# Patient Record
Sex: MALE | Race: WHITE
[De-identification: names, ages, dates, MRNs, and addresses within clinical notes are randomized per-mention and may not be internally consistent; named-entity substitution may affect disease eponyms.]

---

## 2017-12-24 ENCOUNTER — HOSPITAL ENCOUNTER (EMERGENCY)
Dept: HOSPITAL 80 - FED | Age: 47
Discharge: HOME | End: 2017-12-24
Payer: MEDICAID

## 2017-12-24 VITALS — OXYGEN SATURATION: 96 %

## 2017-12-24 VITALS
DIASTOLIC BLOOD PRESSURE: 63 MMHG | TEMPERATURE: 98.4 F | SYSTOLIC BLOOD PRESSURE: 132 MMHG | RESPIRATION RATE: 18 BRPM | HEART RATE: 82 BPM

## 2017-12-24 DIAGNOSIS — F19.239: Primary | ICD-10-CM

## 2017-12-24 DIAGNOSIS — E86.9: ICD-10-CM

## 2017-12-24 LAB — PLATELET # BLD: 299 10^3/UL (ref 150–400)

## 2017-12-24 NOTE — EDPHY
HPI/HX/ROS/PE/MDM


Narrative: 





CHIEF COMPLAINT:  Head pressure





HISTORY OF PRESENT ILLNESS:   The patient is a 48 y/o male with a history of 

depression, nerve pain, hypercholesterolemia, and anxiety complaining of head 

pressure for the past three to four days after stopping his daily medication. 

He moved to [place] from [place] three weeks ago and has been slowly running 

out of his medication. He ran out of BuSpar and Zoloft 2 weeks ago, gabapentin 

1 week ago, Lipitor 2 days ago, and Prilosec yesterday. Three or four days ago, 

he began feeling a pressure in his head. He describes the pressure as 

intermittent during the day, worse with exertion, and reminiscent of feeling 

"his synapses in his brain firing very fast", similar to a throbbing. He also 

has had several instances of seeing falling, silver stars for five to ten 

minutes at a time. He has associated numbness in his hands. He is an every day 

smoker with a cough and uses marijuana daily. He denies nausea, vomiting, or 

any other associated symptoms. He denies opiate use, aspirin use, or other drug 

use. He denies falls or other trauma. He denies history of migraines, 

hypertension, or stroke.





No fever, chills, chest pain, shortness of breath, palpitations, vomiting, 

diarrhea, urinary complaints, headache, lightheadedness. 





REVIEW OF SYSTEMS:


Aside from elements discussed in the HPI, a comprehensive 10-point review of 

systems was reviewed and is negative.





PAST MEDICAL HISTORY:   Depression, nerve pain, hypercholesterolemia, anxiety





SOCIAL HISTORY:   Moved from [place], daily marijuana user





VITAL SIGNS: Reviewed by me


GENERAL: Well-developed, well-nourished, resting comfortably in no respiratory 

distress.


HEENT: No visible signs of trauma. Eyes: No icterus, no injection. PERRL, EOMI, 

no nystagmus. Mouth: moist mucous membranes.  No erythema or lesions. Neck: 

supple with no adenopathy. No meningismus.  


LUNGS: Clear to auscultation bilaterally, no wheezes, rhonchi or rales.


CARDIAC: Regular rate and rhythm, no rubs, murmurs or gallops.


ABDOMEN: Soft, nontender, nondistended, bowel sounds normal.


BACK:  No CVA tenderness.


EXTREMITIES: No trauma. No edema.  Range of motion is normal throughout.


NEURO: Alert, oriented x3, cranial nerves II through XII intact, motor 5/5, 

sensory intact to light touch, normal gait. Non-focal exam 


SKIN: Warm and dry, no rash.


PSYCHIATRIC: Normal mentation, no agitation.





ED Course: 





The patient presents with head pressure after stopping his daily medications. 

He ran out of his medications since moving to the area recently. He has not 

taken his gabapentin, BuSpar, or Zoloft in over a week. He has associated hand 

numbness and episodes of seeing stars. ON exam, he is neurologically intact and 

has a nonfocal exam.  No signs of trauma, no fever or infectious complaints, 

headache is mild and not described as thunderclap, severe, or worse in life. 





1432: Labs are reassuring.  Fluid provided.  Symptoms improved.  Patient seen 

by [facility]  and resources provided to obtain meds and establish 

care.  


MDM: 





Diff dx considered included medication withdrawl symptoms, anxiety, electrolyte 

abnormalities, dehydration.





- Data Points


Laboratory Results: 


 Laboratory Results





 12/24/17 14:10 





 12/24/17 14:10 








Medications Given: 


 








Discontinued Medications





Sodium Chloride (Ns)  1,000 mls @ 0 mls/hr IV ONCE ONE; Wide Open


   PRN Reason: Protocol


   Stop: 12/24/17 13:27


   Last Admin: 12/24/17 14:10 Dose:  1,000 mls


Ibuprofen (Motrin)  600 mg PO EDNOW ONE


   Stop: 12/24/17 13:27


   Last Admin: 12/24/17 14:04 Dose:  600 mg








General


Time Seen by Provider: 12/24/17 12:59


Initial Vital Signs: 


 Initial Vital Signs











Temperature (C)  36.5 C   12/24/17 11:11


 


Heart Rate  92   12/24/17 11:11


 


Respiratory Rate  16   12/24/17 11:11


 


Blood Pressure  128/98 H  12/24/17 11:11


 


O2 Sat (%)  96   12/24/17 11:11








 











O2 Delivery Mode               Room Air














Allergies/Adverse Reactions: 


 





No Known Allergies Allergy (Verified 12/24/17 11:10)


 








Home Medications: 














 Medication  Instructions  Recorded


 


GABAPENTIN  12/24/17


 


Gabapentin [Neurontin 300 MG (*)] 300 mg PO HS #30 cap 12/24/17


 


Lisinopril [Zestril 20 mg (*)] 20 mg PO 12/24/17


 


Omeprazole 40 mg PO DAILY #30 cap 12/24/17


 


Omeprazole [Prilosec 20 mg] 20 mg PO DAILY 12/24/17


 


Ranitidine HCl [Zantac 75] 75 mg PO DAILY #30 tablet 12/24/17


 


Sertraline HCl [Zoloft 25mg (*)] 25 mg PO DAILY #20 tab 12/24/17


 


busPIRone [Buspar (*)] 5 mg PO TID #30 tab 12/24/17














Departure





- Departure


Disposition: Home, Routine, Self-Care


Clinical Impression: 


 Symptom of drug withdrawal





Condition: Good


Instructions:  Omeprazole (By mouth), Sertraline (By mouth), Gabapentin (By 

mouth)


Additional Instructions: 


1. Drink lots of fluid and get lots of rest.


2. Follow-up with [facility].


3. Return to the ED for headache or other worsening of condition. 


Referrals: 


[facility],. [Clinic] - As per Instructions


Prescriptions: 


busPIRone [Buspar (*)] 5 mg PO TID #30 tab


Gabapentin [Neurontin 300 MG (*)] 300 mg PO HS #30 cap


Omeprazole 40 mg PO DAILY #30 cap


Ranitidine HCl [Zantac 75] 75 mg PO DAILY #30 tablet


Sertraline HCl [Zoloft 25mg (*)] 25 mg PO DAILY #20 tab


Report Scribed for: Yasmine Patel


Report Scribed by: Angelita Fields


Date of Report: 12/24/17


Time of Report: 14:17


Physician Review and Approval Statement: Portions of this note were transcribed 

by a medical scribe.  I personally performed a history, physical exam, medical 

decision making, and confirmed accuracy of information the transcribed note.

## 2017-12-24 NOTE — ASMTCMCOM
CM Note

 

CM Note                       

Notes:

Per MD, patient new to [facility] from [place] and has run out of RX. I spoke with him and gave him 

[facility] contact information, encouraged him to follow up there after [facility]. Per 

admissions, it appears he has active [place] Medicaid, so MD will write RX in the hopes that patient can 


fill them before his appointment at [facility].

 

Date Signed:  12/24/2017 01:47 PM

Electronically Signed By:Ela Baldwin RN

## 2018-06-16 ENCOUNTER — HOSPITAL ENCOUNTER (EMERGENCY)
Dept: HOSPITAL 80 - FED | Age: 48
Discharge: HOME | End: 2018-06-16
Payer: MEDICAID

## 2018-06-16 VITALS — DIASTOLIC BLOOD PRESSURE: 85 MMHG | SYSTOLIC BLOOD PRESSURE: 145 MMHG

## 2018-06-16 DIAGNOSIS — F10.920: ICD-10-CM

## 2018-06-16 DIAGNOSIS — F32.9: Primary | ICD-10-CM

## 2018-06-16 LAB — PLATELET # BLD: 286 10^3/UL (ref 150–400)

## 2018-06-16 PROCEDURE — G0480 DRUG TEST DEF 1-7 CLASSES: HCPCS

## 2018-06-16 NOTE — EDPHY
H & P


Smoking Status: Current every day smoker


Time Seen by Provider: 06/16/18 07:51


HPI/ROS: 





CHIEF COMPLAINT:  "I drank too much"





HISTORY OF PRESENT ILLNESS:  Patient is a 48-year-old male who is brought to 

the emergency department by EMS and PD on an M1 hold.  Per report, the patient 

was texting his girlfriend.  He wrote that he was the devil and that he wanted 

to kill himself.  Please subsequently broken to his swelling and found the 

patient face down intoxicated appearing.





Patient states that he drank too much.  He smoked marijuana earlier but denies 

other drug use.  Patient has no suicidal thoughts at this time.  He denies 

ingestion for self-harm.  Patient does have a history of depression.  He has 

had suicidal thoughts previously.  He currently takes Zoloft is seen at 

[facility].  Patient has no headache.  No neck pain.  No focal weakness or 

numbness.





REVIEW OF SYSTEMS:  


My complete review of systems is negative except as mentioned in the HPI. (

Genoveva Gomez)


Past Medical/Surgical History: 





Includes depression, GERD





Social history:  Includes alcohol and THC. (Genoveva Gomez)


Physical Exam: 





Vitals noted


GENERAL:  No acute distress, alert.


HEENT:  Eyes normal to inspection, normal pharynx, no signs of dehydration.  

Patient has a small bump on his head posteriorly.  There is no crepitus.  No 

tenderness palpation.  No surrounding bruising.


NECK:  No thyromegaly, no lymphadenopathy, supple.  No spinal tenderness 

palpation.  No step-off.


RESPIRATORY:  Clear to auscultation bilaterally, no rales, rhonchi or wheezing.


CVS:  Regular rate and rhythm, no rubs, murmurs, or gallops.


ABDOMEN:  Soft, nontender, nondistended, no organomegaly.


BACK:  Normal to inspection, no CVA tenderness.


SKIN:  Normal color, no rash, warm, dry.  No pallor.


EXTREMITIES:  No pedal edema, no calf tenderness, no Homans sign or cords, no 

joint swelling.


NEURO/PSYCH:  Alert and oriented x3.  Patient is mildly intoxicated appearing 

but answers all my questions appropriately.  Normal motor sensory exam.  No 

obvious cranial nerve deficit. (Genoveva Gomez)


Constitutional: 





 Initial Vital Signs











Temperature (C)  36.7 C   06/16/18 07:58


 


Heart Rate  78   06/16/18 07:58


 


Respiratory Rate  18   06/16/18 07:58


 


Blood Pressure  142/88 H  06/16/18 07:58


 


O2 Sat (%)  95   06/16/18 07:58








 











O2 Delivery Mode               Room Air














Allergies/Adverse Reactions: 


 





No Known Allergies Allergy (Verified 12/24/17 11:10)


 








Home Medications: 














 Medication  Instructions  Recorded


 


GABAPENTIN  12/24/17


 


Gabapentin [Neurontin 300 MG (*)] 300 mg PO HS #30 cap 12/24/17


 


Lisinopril [Zestril 20 mg (*)] 20 mg PO 12/24/17


 


Omeprazole 40 mg PO DAILY #30 cap 12/24/17


 


Omeprazole [Prilosec 20 mg] 20 mg PO DAILY 12/24/17


 


Ranitidine HCl [Zantac 75] 75 mg PO DAILY #30 tablet 12/24/17


 


Sertraline HCl [Zoloft 25mg (*)] 25 mg PO DAILY #20 tab 12/24/17


 


busPIRone [Buspar (*)] 5 mg PO TID #30 tab 12/24/17














Medical Decision Making


ED Course/Re-evaluation: 





In the emergency department I discussed possible etiologies with the patient.  

Answered all questions.  I discussed the case with both EMS and PD.  Hold was 

placed by PD.  Laboratory studies were ordered.





Patient's sodium is mildly high at 150. Patient's CBC is normal.  Alcohol 71. 

Tox screen is positive for THC.  





The patient was stable during his stay.  He is awaiting evaluation.





Patient is signed out to Dr. Hoover at change of shift. (Genoveva Gomez)





3:40 p.m. the patient has been evaluated by [facility].  They plan 

to follow up with him as an outpatient.  He is not suicidal and is now sober.  

They recommend discharge. (Trevor Hoover)


Differential Diagnosis: 





My differential includes but is not limited to depression, suicidal ideation, 

alcohol intoxication, marijuana abuse, drug abuse, electrolyte abnormality, 

sugar abnormality (Genoveva Gomez)





- Data Points


Laboratory Results: 





 Laboratory Results





 06/16/18 07:53 





 06/16/18 07:53 





 











  06/16/18 06/16/18 06/16/18





  08:05 07:53 07:53


 


WBC      9.54 10^3/uL H 10^3/uL





     (3.80-9.50) 


 


RBC      5.03 10^6/uL 10^6/uL





     (4.40-6.38) 


 


Hgb      16.0 g/dL g/dL





     (13.7-17.5) 


 


Hct      44.8 % %





     (40.0-51.0) 


 


MCV      89.1 fL fL





     (81.5-99.8) 


 


MCH      31.8 pg pg





     (27.9-34.1) 


 


MCHC      35.7 g/dL g/dL





     (32.4-36.7) 


 


RDW      15.0 % %





     (11.5-15.2) 


 


Plt Count      286 10^3/uL 10^3/uL





     (150-400) 


 


MPV      9.9 fL fL





     (8.7-11.7) 


 


Neut % (Auto)      74.8 % H %





     (39.3-74.2) 


 


Lymph % (Auto)      16.1 % %





     (15.0-45.0) 


 


Mono % (Auto)      7.9 % %





     (4.5-13.0) 


 


Eos % (Auto)      0.1 % L %





     (0.6-7.6) 


 


Baso % (Auto)      0.3 % %





     (0.3-1.7) 


 


Nucleat RBC Rel Count      0.0 % %





     (0.0-0.2) 


 


Absolute Neuts (auto)      7.13 10^3/uL H 10^3/uL





     (1.70-6.50) 


 


Absolute Lymphs (auto)      1.54 10^3/uL 10^3/uL





     (1.00-3.00) 


 


Absolute Monos (auto)      0.75 10^3/uL 10^3/uL





     (0.30-0.80) 


 


Absolute Eos (auto)      0.01 10^3/uL L 10^3/uL





     (0.03-0.40) 


 


Absolute Basos (auto)      0.03 10^3/uL 10^3/uL





     (0.02-0.10) 


 


Absolute Nucleated RBC      0.00 10^3/uL 10^3/uL





     (0-0.01) 


 


Immature Gran %      0.8 % %





     (0.0-1.1) 


 


Immature Gran #      0.08 10^3/uL 10^3/uL





     (0.00-0.10) 


 


Sodium    150 mEq/L H mEq/L  





    (135-145)  


 


Potassium    4.2 mEq/L mEq/L  





    (3.3-5.0)  


 


Chloride    110 mEq/L mEq/L  





    ()  


 


Carbon Dioxide    20 mEq/l L mEq/l  





    (22-31)  


 


Anion Gap    20 mEq/L H mEq/L  





    (8-16)  


 


BUN    21 mg/dL mg/dL  





    (7-23)  


 


Creatinine    0.9 mg/dL mg/dL  





    (0.7-1.3)  


 


Estimated GFR    > 60   





    


 


Glucose    116 mg/dL H mg/dL  





    ()  


 


Calcium    9.7 mg/dL mg/dL  





    (8.5-10.4)  


 


Salicylates    < 1.0 mg/dL L mg/dL  





    (2.0-20.0)  


 


Urine Opiates Screen  NEGATIVE     





   (NEGATIVE)   


 


Acetaminophen    < 10 mcg/mL L mcg/mL  





    (10-30)  


 


Urine Barbiturates  NEGATIVE     





   (NEGATIVE)   


 


Ur Phencyclidine Scrn  NEGATIVE     





   (NEGATIVE)   


 


Ur Amphetamine Screen  NEGATIVE     





   (NEGATIVE)   


 


U Benzodiazepines Scrn  NEGATIVE     





   (NEGATIVE)   


 


Urine Cocaine Screen  NEGATIVE     





   (NEGATIVE)   


 


U Marijuana (THC) Screen  NON-NEGATIVE  H     





   (NEGATIVE)   


 


Ethyl Alcohol    71 mg/dL H mg/dL  





    (0-10)  











Medications Given: 





 








Discontinued Medications





Al Hydroxide/Mg Hydroxide (Maalox Susp)  30 ml PO EDNOW ONE


   Stop: 06/16/18 08:02


   Last Admin: 06/16/18 08:09 Dose:  30 ml


Al Hydroxide/Mg Hydroxide (Maalox Susp)  30 ml PO EDNOW ONE


   Stop: 06/16/18 11:49


   Last Admin: 06/16/18 11:55 Dose:  30 ml


Ondansetron HCl (Zofran Odt)  4 mg PO EDNOW ONE


   Stop: 06/16/18 11:48


   Last Admin: 06/16/18 11:55 Dose:  Not Given








Departure





- Departure


Disposition: Home, Routine, Self-Care


Clinical Impression: 


Alcohol intoxication


Qualifiers:


 Complication of substance-induced condition: uncomplicated Qualified Code(s): 

F10.920 - Alcohol use, unspecified with intoxication, uncomplicated





Depression


Qualifiers:


 Depression Type: unspecified Qualified Code(s): F32.9 - Major depressive 

disorder, single episode, unspecified





Condition: Good


Instructions:  Depression (ED), Alcohol Intoxication (ED)


Referrals: 


[facility],. [Clinic] - 2-3 days, if not improved


[facility],. [Clinic] - As per Instructions

## 2018-07-21 ENCOUNTER — HOSPITAL ENCOUNTER (OUTPATIENT)
Dept: HOSPITAL 80 - FED | Age: 48
Setting detail: OBSERVATION
LOS: 1 days | Discharge: HOME | End: 2018-07-22
Attending: FAMILY MEDICINE | Admitting: FAMILY MEDICINE
Payer: MEDICAID

## 2018-07-21 DIAGNOSIS — R06.02: ICD-10-CM

## 2018-07-21 DIAGNOSIS — E66.9: ICD-10-CM

## 2018-07-21 DIAGNOSIS — R07.9: Primary | ICD-10-CM

## 2018-07-21 DIAGNOSIS — E78.5: ICD-10-CM

## 2018-07-21 DIAGNOSIS — M71.22: ICD-10-CM

## 2018-07-21 DIAGNOSIS — F41.8: ICD-10-CM

## 2018-07-21 LAB
CK SERPL-CCNC: 132 IU/L (ref 0–224)
INR PPP: 0.96 (ref 0.83–1.16)
PLATELET # BLD: 284 10^3/UL (ref 150–400)
PROTHROMBIN TIME: 13 SEC (ref 12–15)

## 2018-07-21 PROCEDURE — 93017 CV STRESS TEST TRACING ONLY: CPT

## 2018-07-21 PROCEDURE — G0480 DRUG TEST DEF 1-7 CLASSES: HCPCS

## 2018-07-21 PROCEDURE — 93306 TTE W/DOPPLER COMPLETE: CPT

## 2018-07-21 PROCEDURE — 93971 EXTREMITY STUDY: CPT

## 2018-07-21 PROCEDURE — G0378 HOSPITAL OBSERVATION PER HR: HCPCS

## 2018-07-21 PROCEDURE — 99285 EMERGENCY DEPT VISIT HI MDM: CPT

## 2018-07-21 PROCEDURE — 93005 ELECTROCARDIOGRAM TRACING: CPT

## 2018-07-21 PROCEDURE — 71045 X-RAY EXAM CHEST 1 VIEW: CPT

## 2018-07-21 PROCEDURE — 96360 HYDRATION IV INFUSION INIT: CPT

## 2018-07-21 RX ADMIN — NITROGLYCERIN PRN MG: 0.4 TABLET SUBLINGUAL at 22:32

## 2018-07-21 RX ADMIN — NITROGLYCERIN PRN MG: 0.4 TABLET SUBLINGUAL at 22:42

## 2018-07-21 NOTE — CPEKG
Heart Rate: 80

RR Interval: 750

P-R Interval: 172

QRSD Interval: 88

QT Interval: 384

QTC Interval: 443

P Axis: 67

QRS Axis: 264

T Wave Axis: 66

EKG Severity - BORDERLINE ECG -

EKG Impression: SINUS RHYTHM

EKG Impression: MARKEDLY POSTERIOR QRS AXIS

EKG Impression: BORDERLINE T ABNORMALITIES, ANT-LAT LEADS

Electronically Signed By: Richard Serrato 22-Jul-2018 06:43:41

## 2018-07-21 NOTE — EDPHY
H & P


Stated Complaint: SOB and left thigh pain with CP and left arm pain


Time Seen by Provider: 07/21/18 22:03


HPI/ROS: 





HPI





CHIEF COMPLAINT:  Left-sided chest pain, left arm pain, left leg pain





HISTORY OF PRESENT ILLNESS:  48-year-old male, history of hyperlipidemia, 

borderline hypertension, and tobacco use smokes 1 pack per day, presents to the 

emergency room with complaint of left-sided chest pain left arm pain.  Patient 

reports that at 9:30 a.m. This evening which it is now 10 30 he developed some 

pain behind his left leg.  Describes an achy sensation.  This went into his 

left arm and left chest.  He states now he has some pressure in left side of 

his chest with some deep achiness in the left arm.  He denies any significant 

shortness of breath or pleuritic pain.  However does state over the past few 

days he is feeling more short of breath.


Currently does complain of left-sided chest pressure 6/10.





Past Medical History:  Hyperlipidemia, borderline hypertension, tobacco abuse





Past Surgical History:  No recent surgical history





Social History:  Smokes tobacco daily.





Family History:  Denies any immediate family history of cardiovascular disease 

denies heart disease mom dad sister brother.  However grandfather has heart 

disease








ROS   


REVIEW OF SYSTEMS:


A comprehensive 10 point review of systems is otherwise negative aside from 

elements mentioned in the history of present illness.








Exam   


Constitutional nontoxic appearing, obese,  triage nursing summary reviewed, 

vital signs reviewed, awake/alert. 


Eyes   normal conjunctivae and sclera, EOMI, PERRLA. 


HENT   normal inspection, atraumatic, moist mucus membranes, no epistaxis, neck 

supple/ no meningismus, no raccoon eyes. 


Respiratory   clear to auscultation bilaterally, normal breath sounds, no 

respiratory distress, no wheezing. 


Cardiovascular   rate normal, regular rhythm, no murmur, no edema, distal 

pulses normal. 


Gastrointestinal   soft, non-tender, no rebound, no guarding, normal bowel 

sounds, no distension, no pulsatile mass. 


Genitourinary   no CVA tenderness. 


Musculoskeletal  no significant swelling noted to either leg.  no midline 

vertebral tenderness, full range of motion, no calf swelling, no tenderness of 

extremities, no meningismus, good pulses, neurovascularly intact.


Skin   pink, warm, & dry, no rash, skin atraumatic. 


Neurologic   awake, alert and oriented x 3, AAOx3, moves all 4 extremities 

equally, motor intact, sensory intact, CN II-XII intact, normal cerebellar, 

normal vision, normal speech. 


Psychiatric   normal mood/affect. 


Heme/Lymph/Immune   no lymphadenopathy.





Differential diagnosis includes but is not limited to:  ACS, atypical chest pain

, pneumothorax, pneumonia, pulmonary embolism, aortic dissection, congestive 

heart failure, tumor, musculoskeletal pain, esophageal pain, GERD, peptic ulcer 

disease, pancreatitis





Medical Decision Making:  Plan for this patient IV establishment full cardiac 

monitor obtain EKG and troponin to rule out acute coronary syndrome, full-dose 

aspirin, dose of nitroglycerin to see if this improves his chest pressure.  

Chest x-ray.  Additionally D-dimer additionally ultrasound left lower extremity 

rule out DVT.





Re-evaluation:








EKG interpretation by me on record in "[employer] system.  Impression time of 

EKG 2200, sinus rhythm rate of 80 T-wave abnormality V1 V2.  No ST elevation no 

ST depression.  No prolonged intervals.  When compared to the old EKG dated 7/29 /2014 similar morphology however the T-wave inversions V1 V2 are new.





Ultrasound of the left lower extremity for pain shows no evidence of DVT does 

show a rather large Baker cyst.  Called to me by Dr. Owens.





2247:  Given the patient's chest discomfort, left arm pain, and cardiovascular 

risk factors which includes obesity, hyperlipidemia, hypertension, tobacco use 

1 pack per day I do feel that it is prudent to admit him for serial enzymes and 

cardiac evaluation and cardiac rule out.





The patient is agreed for this.  His EKG does have T-wave inversions V1 V2 

nonspecific.  Otherwise no acute ischemia.





Patient D-dimer is negative troponin negative.











HEART Score for Major Cardiac Events from [email or web address]  on 7/21/2018


** All calculations should be rechecked by clinician prior to use **





RESULT SUMMARY:


4 points


Moderate Score (4-6 points)





Risk of MACE of 12-16.6%.








INPUTS:


History > 1 = Moderately suspicious


EKG > 1 = Non-specific repolarization disturbance


Age > 1 = 45-64


Risk factors > 1 = 1-2 risk factors


Initial troponin > 0 = normal limit








ED x-ray chest shows cardiomegaly.  Otherwise unremarkable chest x-ray.





Of note patient has good pulses in all 4 extremities.  No significant back pain 

or neck pain.  I think aortic dissection is unlikely.





Of note I will ask the hospitalist service to admit for serial enzymes cardiac 

evaluation rule out.


Source: Patient





- Personal History


Current Tetanus/Diphtheria Vaccine: Unsure


Current Tetanus Diphtheria and Acellular Pertussis (TDAP): Unsure





- Medical/Surgical History


Hx Asthma: No


Hx Chronic Respiratory Disease: No


Hx Diabetes: No


Hx Cardiac Disease: No


Hx Renal Disease: No


Hx Cirrhosis: No


Hx Alcoholism: No


Hx HIV/AIDS: No


Hx Splenectomy or Spleen Trauma: No


Other PMH: pmh- depression, anxiety, GERD.  right bicep surgery





- Social History


Smoking Status: Current every day smoker


Constitutional: 


 Initial Vital Signs











Temperature (C)  36.7 C   07/21/18 21:48


 


Heart Rate  89   07/21/18 21:48


 


Respiratory Rate  16   07/21/18 21:48


 


Blood Pressure  109/65   07/21/18 21:48


 


O2 Sat (%)  94   07/21/18 21:48








 











O2 Delivery Mode               Room Air














Allergies/Adverse Reactions: 


 





No Known Allergies Allergy (Verified 07/21/18 21:51)


 








Home Medications: 














 Medication  Instructions  Recorded


 


GABAPENTIN  12/24/17


 


Gabapentin [Neurontin 300 MG (*)] 300 mg PO HS #30 cap 12/24/17


 


Abilify  07/21/18


 


Hydroxyzine HCl  07/21/18


 


Lexapro  07/21/18


 


Prazosin HCl  07/21/18


 


traZODone  07/21/18














Medical Decision Making





- Data Points


Laboratory Results: 


 Laboratory Results





 07/21/18 22:05 





 07/21/18 22:05 





 











  07/21/18 07/21/18 07/21/18





  22:08 22:05 22:05


 


WBC      





    


 


RBC      





    


 


Hgb      





    


 


Hct      





    


 


MCV      





    


 


MCH      





    


 


MCHC      





    


 


RDW      





    


 


Plt Count      





    


 


MPV      





    


 


Neut % (Auto)      





    


 


Lymph % (Auto)      





    


 


Mono % (Auto)      





    


 


Eos % (Auto)      





    


 


Baso % (Auto)      





    


 


Nucleat RBC Rel Count      





    


 


Absolute Neuts (auto)      





    


 


Absolute Lymphs (auto)      





    


 


Absolute Monos (auto)      





    


 


Absolute Eos (auto)      





    


 


Absolute Basos (auto)      





    


 


Absolute Nucleated RBC      





    


 


Immature Gran %      





    


 


Immature Gran #      





    


 


PT      13.0 SEC SEC





     (12.0-15.0) 


 


INR      0.96 





     (0.83-1.16) 


 


APTT      29.6 SEC SEC





     (23.0-38.0) 


 


D-Dimer      < 0.27 ug/mLFEU ug/mLFEU





     (0.00-0.50) 


 


Sodium    141 mEq/L mEq/L  





    (135-145)  


 


Potassium    4.2 mEq/L mEq/L  





    (3.3-5.0)  


 


Chloride    106 mEq/L mEq/L  





    ()  


 


Carbon Dioxide    25 mEq/l mEq/l  





    (22-31)  


 


Anion Gap    10 mEq/L mEq/L  





    (8-16)  


 


BUN    19 mg/dL mg/dL  





    (7-23)  


 


Creatinine    1.1 mg/dL mg/dL  





    (0.7-1.3)  


 


Estimated GFR    > 60   





    


 


Glucose    91 mg/dL mg/dL  





    ()  


 


Calcium    9.5 mg/dL mg/dL  





    (8.5-10.4)  


 


Magnesium    1.8 mg/dL mg/dL  





    (1.6-2.3)  


 


Total Bilirubin    0.5 mg/dL mg/dL  





    (0.1-1.4)  


 


Conjugated Bilirubin    0.3 mg/dL mg/dL  





    (0.0-0.5)  


 


Unconjugated Bilirubin    0.2 mg/dL mg/dL  





    (0.0-1.1)  


 


AST    27 IU/L IU/L  





    (17-59)  


 


ALT    48 IU/L IU/L  





    (21-72)  


 


Alkaline Phosphatase    76 IU/L IU/L  





    ()  


 


Creatine Kinase    132 IU/L IU/L  





    (0-224)  


 


CK-MB (CK-2) Fraction    1.63 ng/mL ng/mL  





    (0.00-4.55)  


 


POC Troponin I  0.00 ng/mL ng/mL    





   (0.00-0.08)   


 


NT-Pro-B Natriuret Pep    12 pg/mL pg/mL  





    (0-125)  


 


Total Protein    7.1 g/dL g/dL  





    (6.3-8.2)  


 


Albumin    4.2 g/dL g/dL  





    (3.5-5.0)  


 


Lipase    101 IU/L IU/L  





    ()  














  07/21/18





  22:05


 


WBC  7.45 10^3/uL 10^3/uL





   (3.80-9.50) 


 


RBC  4.85 10^6/uL 10^6/uL





   (4.40-6.38) 


 


Hgb  15.4 g/dL g/dL





   (13.7-17.5) 


 


Hct  43.8 % %





   (40.0-51.0) 


 


MCV  90.3 fL fL





   (81.5-99.8) 


 


MCH  31.8 pg pg





   (27.9-34.1) 


 


MCHC  35.2 g/dL g/dL





   (32.4-36.7) 


 


RDW  14.3 % %





   (11.5-15.2) 


 


Plt Count  284 10^3/uL 10^3/uL





   (150-400) 


 


MPV  9.2 fL fL





   (8.7-11.7) 


 


Neut % (Auto)  57.0 % %





   (39.3-74.2) 


 


Lymph % (Auto)  32.5 % %





   (15.0-45.0) 


 


Mono % (Auto)  8.3 % %





   (4.5-13.0) 


 


Eos % (Auto)  1.3 % %





   (0.6-7.6) 


 


Baso % (Auto)  0.4 % %





   (0.3-1.7) 


 


Nucleat RBC Rel Count  0.0 % %





   (0.0-0.2) 


 


Absolute Neuts (auto)  4.24 10^3/uL 10^3/uL





   (1.70-6.50) 


 


Absolute Lymphs (auto)  2.42 10^3/uL 10^3/uL





   (1.00-3.00) 


 


Absolute Monos (auto)  0.62 10^3/uL 10^3/uL





   (0.30-0.80) 


 


Absolute Eos (auto)  0.10 10^3/uL 10^3/uL





   (0.03-0.40) 


 


Absolute Basos (auto)  0.03 10^3/uL 10^3/uL





   (0.02-0.10) 


 


Absolute Nucleated RBC  0.00 10^3/uL 10^3/uL





   (0-0.01) 


 


Immature Gran %  0.5 % %





   (0.0-1.1) 


 


Immature Gran #  0.04 10^3/uL 10^3/uL





   (0.00-0.10) 


 


PT  





  


 


INR  





  


 


APTT  





  


 


D-Dimer  





  


 


Sodium  





  


 


Potassium  





  


 


Chloride  





  


 


Carbon Dioxide  





  


 


Anion Gap  





  


 


BUN  





  


 


Creatinine  





  


 


Estimated GFR  





  


 


Glucose  





  


 


Calcium  





  


 


Magnesium  





  


 


Total Bilirubin  





  


 


Conjugated Bilirubin  





  


 


Unconjugated Bilirubin  





  


 


AST  





  


 


ALT  





  


 


Alkaline Phosphatase  





  


 


Creatine Kinase  





  


 


CK-MB (CK-2) Fraction  





  


 


POC Troponin I  





  


 


NT-Pro-B Natriuret Pep  





  


 


Total Protein  





  


 


Albumin  





  


 


Lipase  





  











Medications Given: 


 





Nitroglycerin (Nitrostat)  0.4 mg SL Q5M PRN


   PRN Reason: Chest Pain


   Last Admin: 07/21/18 22:42 Dose:  0.4 mg





Discontinued Medications





Aspirin (Aspirin)  324 mg PO EDNOW ONE


   Stop: 07/21/18 22:10


   Last Admin: 07/21/18 22:31 Dose:  324 mg


Sodium Chloride (Ns)  1,000 mls @ 0 mls/hr IV EDNOW ONE; Wide Open


   PRN Reason: Protocol


   Stop: 07/21/18 22:10


   Last Admin: 07/21/18 22:31 Dose:  1,000 mls





Point of Care Test Results: 


 Chemistry











  07/21/18





  22:08


 


POC Troponin I  0.00 ng/mL ng/mL





   (0.00-0.08) 














Departure





- Departure


Disposition: [facility] Inpatient Acute


Clinical Impression: 


Chest pain


Qualifiers:


 Chest pain type: unspecified Qualified Code(s): R07.9 - Chest pain, unspecified





Condition: Fair


Referrals: 


NONE *PRIMARY CARE P,. [Primary Care Provider] - As per Instructions

## 2018-07-22 VITALS — SYSTOLIC BLOOD PRESSURE: 118 MMHG | DIASTOLIC BLOOD PRESSURE: 86 MMHG

## 2018-07-22 NOTE — ASDISCHSUM
----------------------------------------------

Discharge Information

----------------------------------------------

Plan Status:Home with No Needs                       Medically Cleared to Leave:07/22/2018

Discharge Date:07/22/2018                            CM D/C Disposition:Home, Routine, Self-Care

ADT D/C Disposition:Home, Routine, Self-Care         Projected Discharge Date:07/22/2018

Transportation at D/C:Self                           Discharge Delay Reason:

Follow-Up Date:07/22/2018                            Discharge Slot:

Final Diagnosis:

----------------------------------------------

Placement Information

----------------------------------------------

----------------------------------------------

Patient Contact Information

----------------------------------------------

Contact Name:ANTWAN                              Relationship:Other

Address:                                             Home [phone]

                                                     Work Phone:

City:                                                [facility] Phone:

State/Zip Code:[place]                                    Email:

----------------------------------------------

Financial Information

----------------------------------------------

Financial Class:Medicaid

Primary Plan Desc:[employer] [place]        Primary Plan Number:[id number]

Secondary Plan Desc:                                 Secondary Plan Number:

 

 

----------------------------------------------

Assessment Information

----------------------------------------------

----------------------------------------------

LACE

----------------------------------------------

LACE

 

Length of stay for            Answers:  Less than 1 day                       

current admission                                                             

Acuity / Level of             Answers:  No                                    

Care: Did the patient                                                         

have an inpatient                                                             

admission?                                                                    

Comorbidities - select        Answers:  Other                         Notes:  GERD, hyperlipidemia, b
ord

all that apply                                                                lynsey HTN, daily tobac
co 

                                                                              use,

# of Emergency department     Answers:  1-2                                   

visits in the last 6                                                          

months                                                                        

Social determinants           Answers:  Mental health diagnosis               

                                        (anxiety, depression, pers            

                                        onality disorders, etc.)              

Score: 5

 

Date Signed:  07/22/2018 01:58 PM

Electronically Signed By:Jannette Alvarado RN

 

 

----------------------------------------------

Intervention Information

----------------------------------------------

## 2018-07-22 NOTE — CPIP
[f rep st]



                                                       INVASIVE CARDIAC PROCEDURE





PROCEDURE:  Stress test.



DESCRIPTION:  The patient went over 6 minutes on a Tima protocol.  The test was stopped for fatigue 
and shortness of breath.  He had no chest pain. 



He reached 81% of his maximum predicted heart rate.  His EKG was unremarkable for significant ventric
ular arrhythmias. 



His baseline EKG was abnormal with poor R-wave progression.  He had nonspecific ST-T changes as well.
 



There were no diagnostic EKG changes with exercise. 



His Duke treadmill score would be 6 indicating a low probability of cardiovascular event over the nex
t 3 years; however, this test is not completely conclusive because the patient failed to reach 85% of
 maximum predicted heart rate. 



At this point in time, there is no clinical reason from how he performed on this test and his Pickett tr
eadmill score to think that he has to stay and get a nuclear imaging study with Lexiscan. 



However, if he has ongoing chest pain, I have told him he needs to follow with his primary care docto
rs, and then, the workup can be evaluated with a pharmacologic nuclear stress test that will give us 
more complete information given his deconditioning. 



I have discussed this with him about that we have other testing we can do and that this test is not 1
00% conclusive so that if he should have ongoing symptoms, persistent symptoms, or new symptoms, he n
eeds to follow with primary care. 



However, at this point in time, he has reached his maximum exercise tolerance.  He has gone over 6 mi
nutes.  He went into 81% of maximum predicted heart rate and he has no sign of significant clinical o
r EKG evidence of ischemic coronary artery disease. 



Clinical correlation advised.





Job #:  [id number]

## 2018-07-22 NOTE — PDGENHP
History and Physical





- Chief Complaint


Left leg pain, Chest pain





- History of Present Illness





Source-patient provides history and appears reliable.  Patient does fall asleep 

intermittently during the interview.  Fipreme at bedside supplements some 

details.  EMR was reviewed and case discussed with ED provider.





HPI-pleasant 48-year-old gentleman with past medical history significant for HLD

, tobacco abuse, borderline HTN, depression/anxiety/PTSD, obesity (BMI 34.4) 

who presents to the emergency department today with complaints of new onset left

-sided chest pain.  Patient reports 1 week history of progressive dyspnea on 

exertion occasional wheezing.  He does have a significant tobacco history of 1 

pack per day for many years.  Patient has not had any outpatient lung function 

testing.  He has not had any previous stress testing by his report.  The 

patient states his initial symptoms of complaint was left posterior knee pain.  

Pain increased in intensity radiating upward to his chest, left neck, and left 

arm.  Patient describes the chest discomfort as pressure with a intermittent 

stabbing type pain.  He noted associated shortness of breath.  No nausea 

vomiting or diaphoresis.  Patient did have some preceding dizziness but 

resolved after arrival to the ED.





In the ED, patient received full-dose aspirin and nitroglycerin with 

improvement of his chest discomfort.  He reported some continued left arm 

achiness which did resolve on the PCU floor.





History Information





- Allergies/Home Medication List


Allergies/Adverse Reactions: 








No Known Allergies Allergy (Verified 07/21/18 21:51)


 





Home Medications: 








GABAPENTIN  12/24/17 [Last Taken Unknown]


Abilify  07/21/18 [Last Taken Unknown]


Hydroxyzine HCl  07/21/18 [Last Taken Unknown]


Lexapro  07/21/18 [Last Taken Unknown]


Prazosin HCl  07/21/18 [Last Taken Unknown]


traZODone  07/21/18 [Last Taken Unknown]





I have personally reviewed and updated: family history, medical history, social 

history, surgical history





- Past Medical History


Additional medical history: Depression/anxiety/PTSD.  History of moderate 

alcohol use.  Daily marijuana use.  Elevated blood pressures.  HLD untreated.  

Tobacco abuse.  Obesity (BMI 34.4)





- Surgical History


Additional surgical history: Right bicep tendon repair





- Family History


Additional family history: Grandfather with history CAD and MI age unknown.





- Social History


Smoking Status: Current every day smoker


Alcohol Use: Other (Patient reports drinking 2-3 alcoholic drinks per day 3 or 

more times per week.)


Drug Use: Marijuana (Multiple times daily)


Additional social history: Patient resides with [facility].  Cor status-full





Review of Systems


Review of Systems: 





ROS: 10pt was reviewed & negative except for what was stated in HPI & below





Physical Exam


Physical Exam: 








 Selected Entries











  07/21/18





  21:48


 


Blood Pressure Automatic





Method 


 


Heart Rate 89


 


Respiratory 16





Rate 


 


O2 Sat (%) 94


 


Temperature (C) 36.7 C


 


Blood Pressure 109/65


 


Mean Arterial 79





Pressure (MAP) 


 


O2 Delivery Room Air





Mode 


 


Temperature Oral





Source 

















Temp Pulse Resp BP Pulse Ox


 


 36.9 C   78   18   113/63   96 


 


 07/21/18 23:38  07/21/18 23:38  07/21/18 23:38  07/21/18 23:38  07/21/18 23:38




















O2 (L/minute)                  2














Constitutional: no apparent distress, other (NAD.  Pleasant obese adult 

gentleman is lying quietly in bed.  Fiancee at bedside.)


Eyes: PERRL (Pupils nearly pinpoint.  Limited reactivity to light bilaterally 

but symmetric.), anicteric sclera, EOMI, scleral injection


Ears, Nose, Mouth, Throat: moist mucous membranes, other (No nasal discharge.), 

No poor dentition


Cardiovascular: regular rate and rhythym, no murmur, rub, or gallop, pulses 

symmetric bilaterally, No edema


Peripheral Pulses: 2+: dorsalis-pedis (R), dorsalis-pedis (L)


Respiratory: no respiratory distress, no rales or rhonchi, clear to auscultation

, No expiratory wheeze, No inspiratory crackles, No respiratory distress


Gastrointestinal: normoactive bowel sounds, soft, non-tender abdomen, no 

palpable masses, other (Obese abdomen), No guarding, No distension


Genitourinary: no bladder tenderness, No lenz in urethra


Skin: warm, normal color, no rashes or abrasions


Musculoskeletal: full muscle strength, other (Patient moves all extremities 

while lying in bed.)


Neurologic: AAOx3, sensation intact bilaterally, other (Grossly nonfocal exam.)

, No facial droop


Psychiatric: interacting appropriately, not anxious, not encephalopathic, 

thought process linear, flat affect, other (Patient is fatigued and slightly 

somnolent but he is otherwise appropriate.)





Lab Data & Imaging Review





 07/21/18 22:05





 07/21/18 22:05














WBC  7.45 10^3/uL (3.80-9.50)   07/21/18  22:05    


 


RBC  4.85 10^6/uL (4.40-6.38)   07/21/18  22:05    


 


Hgb  15.4 g/dL (13.7-17.5)   07/21/18  22:05    


 


Hct  43.8 % (40.0-51.0)   07/21/18  22:05    


 


MCV  90.3 fL (81.5-99.8)   07/21/18  22:05    


 


MCH  31.8 pg (27.9-34.1)   07/21/18  22:05    


 


MCHC  35.2 g/dL (32.4-36.7)   07/21/18  22:05    


 


RDW  14.3 % (11.5-15.2)   07/21/18  22:05    


 


Plt Count  284 10^3/uL (150-400)   07/21/18  22:05    


 


MPV  9.2 fL (8.7-11.7)   07/21/18  22:05    


 


Neut % (Auto)  57.0 % (39.3-74.2)   07/21/18  22:05    


 


Lymph % (Auto)  32.5 % (15.0-45.0)   07/21/18  22:05    


 


Mono % (Auto)  8.3 % (4.5-13.0)   07/21/18  22:05    


 


Eos % (Auto)  1.3 % (0.6-7.6)   07/21/18  22:05    


 


Baso % (Auto)  0.4 % (0.3-1.7)   07/21/18  22:05    


 


Nucleat RBC Rel Count  0.0 % (0.0-0.2)   07/21/18  22:05    


 


Absolute Neuts (auto)  4.24 10^3/uL (1.70-6.50)   07/21/18  22:05    


 


Absolute Lymphs (auto)  2.42 10^3/uL (1.00-3.00)   07/21/18  22:05    


 


Absolute Monos (auto)  0.62 10^3/uL (0.30-0.80)   07/21/18  22:05    


 


Absolute Eos (auto)  0.10 10^3/uL (0.03-0.40)   07/21/18  22:05    


 


Absolute Basos (auto)  0.03 10^3/uL (0.02-0.10)   07/21/18  22:05    


 


Absolute Nucleated RBC  0.00 10^3/uL (0-0.01)   07/21/18  22:05    


 


Immature Gran %  0.5 % (0.0-1.1)   07/21/18  22:05    


 


Immature Gran #  0.04 10^3/uL (0.00-0.10)   07/21/18  22:05    


 


PT  13.0 SEC (12.0-15.0)   07/21/18  22:05    


 


INR  0.96  (0.83-1.16)   07/21/18  22:05    


 


APTT  29.6 SEC (23.0-38.0)   07/21/18  22:05    


 


D-Dimer  < 0.27 ug/mLFEU (0.00-0.50)   07/21/18  22:05    


 


Sodium  141 mEq/L (135-145)   07/21/18  22:05    


 


Potassium  4.2 mEq/L (3.3-5.0)   07/21/18  22:05    


 


Chloride  106 mEq/L ()   07/21/18  22:05    


 


Carbon Dioxide  25 mEq/l (22-31)   07/21/18  22:05    


 


Anion Gap  10 mEq/L (8-16)   07/21/18  22:05    


 


BUN  19 mg/dL (7-23)   07/21/18  22:05    


 


Creatinine  1.1 mg/dL (0.7-1.3)   07/21/18  22:05    


 


Estimated GFR  > 60   07/21/18  22:05    


 


Glucose  91 mg/dL ()   07/21/18  22:05    


 


Calcium  9.5 mg/dL (8.5-10.4)   07/21/18  22:05    


 


Magnesium  1.8 mg/dL (1.6-2.3)   07/21/18  22:05    


 


Total Bilirubin  0.5 mg/dL (0.1-1.4)   07/21/18  22:05    


 


Conjugated Bilirubin  0.3 mg/dL (0.0-0.5)   07/21/18  22:05    


 


Unconjugated Bilirubin  0.2 mg/dL (0.0-1.1)   07/21/18  22:05    


 


AST  27 IU/L (17-59)   07/21/18  22:05    


 


ALT  48 IU/L (21-72)   07/21/18  22:05    


 


Alkaline Phosphatase  76 IU/L ()   07/21/18  22:05    


 


Creatine Kinase  132 IU/L (0-224)   07/21/18  22:05    


 


CK-MB (CK-2) Fraction  1.63 ng/mL (0.00-4.55)   07/21/18  22:05    


 


POC Troponin I  0.00 ng/mL (0.00-0.08)   07/21/18  22:08    


 


NT-Pro-B Natriuret Pep  12 pg/mL (0-125)   07/21/18  22:05    


 


Total Protein  7.1 g/dL (6.3-8.2)   07/21/18  22:05    


 


Albumin  4.2 g/dL (3.5-5.0)   07/21/18  22:05    


 


Lipase  101 IU/L ()   07/21/18  22:05    


 


Ethyl Alcohol  < 10 mg/dL (0-10)   07/22/18  01:12    








Imaging Review: 





Portable AP Upright Chest July 21, 2018 at 2224 hours 


 


 Clinical Indications: Chest pain; comparison prior study July 2014. 


 


 Findings: No focal pulmonary consolidation is identified. Basilar opacities 

presumably represent 


atelectasis. There is mild peribronchial thickening noted.  Heart size and 

pulmonary vessels appear 


normal allowing for portable technique.  No evidence of pleural effusion or 

pneumothorax. Pleural 


surfaces and bony thorax are negative for acute abnormality.. 


 


 


Impression: Suspect airways disease with no acute cardiopulmonary abnormality 

identified








____________________





Echocardiogram report from 07/20/2014 was reviewed - noted normal LVEF and 

trace MR otherwise normal.


Visualized and Interpreted Chest x-ray results: Yes


Chest X-Ray results: no infiltrate, other (Peribronchiolar thickening and hyper 

expansion of lung field cardiomegaly noting AP view.)


Visualized and Interpreted imaging results: Yes


EKG additional interpertation: NSR 80s.  T-wave inversion in the anterior 

leads.  T-wave flattening in the lateral leads.  This appears to be new 

compared to EKG from 07/20/2014.  Persistent LAFB.  QTC is 443.





Assessment & Plan


Assessment: 








48-year-old gentleman with history of HLD untreated, obesity(BMI 34.4), 

longstanding tobacco use, who presents emergency department today with 

complaints of chest pain





#Chest pain (Acute) - differential diagnosis including angina vs.  Anxiety vs.  

COPD exacerbation and less likely PE (negative D-dimer).  Patient's HEART score 

is 4.  Patient's chart from 2014 was briefly reviewed.  He had a echocardiogram 

an EKG at that time.  His current EKG does shows some nonspecific changes 

including T-wave inversions in anterio leads and T-wave flattening in the 

lateral leads.   Will plan to monitor closely on PCU and complete serial 

enzymes.  Comparing chest x-ray from 2014 appears patient does have some 

increase in cardiac silhouette but no evidence of pulmonary edema.  Patient 

without history of orthopnea but has been having increasing dyspnea on exertion 

which could certainly be underlying COPD with history of longstanding tobacco 

abuse.  Will trend serial enzymes.  Monitor on telemetry.  Obtain 

echocardiogram in the morning.  Patient without any wheezing or dyspnea at this 

time.  His chest pain and dyspnea improved after ASA and nitroglycerin in the 

emergency department.  Anticipate stress testing later in the morning if 

patient rules out.  Will check a lipid panel in the morning.  Nonfasting blood 

sugar is in the 90s.  I encouraged immediate tobacco cessation.  I also 

encouraged patient to follow up with PCP regarding outpatient PFTs as well as 

sleep apnea testing as kraig reports history of snoring and gasping.





# dyspnea on exertion - echo in a.m. As noted above.  DuoNeb available p.r.n..  

Tobacco cessation encouraged.  Additional outpatient testing including PFTs and 

sleep study recommended for follow up with PCP.





# left leg pain - patient will with some resolution of his pain.  Ultrasound 

did not reveal any DVT but did show evidence of a complex Baker cyst.  

Supportive care at this time ice p.r.n. Patient currently asymptomatic but 

should follow up with PCP if recurrence or additional concerns.





#HLD - patient reports this has been untreated.  Will check a lipid panel this 

morning.


#Elevated blood pressures - patient has not been on any therapy.  Current blood 

pressures are slightly low normal following administration of nitroglycerin. 


#Depression anxiety and PTSD - patient continues on trazodone, Lexapro, prazosin

,gabapentin, hydroxyzine and Abilify.  Ativan has been made available p.r.n. 

For acute symptoms.


#Obesity (BMI 34.4) - lifestyle modifications encouraged.  Recommend weight 

loss.


#Alcohol use - patient with moderate alcohol consumption.  Check alcohol level.


#Marijuana use - U tox pending.





FEN - gentle IV fluid support for low blood pressures.  Electrolyte monitoring 

replacement if needed.  NPO after midnight.


PPX-SCDs.  Consider Lovenox if patient should stay additional day.


Cor status-full


Disposition-patient admitted to observation status on PCU at this time for 

close cardiac monitoring and further evaluation of chest pain.

## 2018-07-22 NOTE — GDS
[f rep st]



                                                             DISCHARGE SUMMARY





DIAGNOSES:  

1.  Chest pain. 

2.  Dyspnea on exertion.  

3.  Left knee Baker cyst.  

4.  Tobacco use.  

5.  Hyperlipidemia. 

6.  Depression, anxiety, and posttraumatic stress disorder.  

7.  Obesity. 

8.  Marijuana use.



HOSPITAL COURSE:  A 48-year-old man presented with chest pain, as well as shortness of breath.  He ha
d serial troponins, which were negative.  EKG showed sinus rhythm with mild T-wave abnormalities.  He
 underwent an exercise treadmill with EKG.  He did 6 minutes on the Tima protocol, attained 81% of h
is heart rate, which was technically nondiagnostic.  Do not have any recurrent chest pain or ST richards
es at that level of exercise.  He feels much better without chest pain currently.  His echocardiogram
 was unremarkable.  I do not think this is cardiac at this point. 



I discussed with him about his tobacco use.  I am concerned that he may have some underlying COPD.  [facility] has had improvement in his shortness of breath when he used a friend of his inhaler.  He will follo
w up with his PCP, whose name he cannot remember, and will request having PFTs ordered.  I have given
 him an albuterol inhaler to try in the meantime.  I strongly recommended tobacco cessation. 



He has fairly significant dyslipidemia.  He had been treated with Lipitor, that was stopped for uncle
ar reasons.  Notably, his triglycerides are 470.  His HDL is 31, LDL was not calculated given the hyp
ertriglyceridemia.  I recommended that he start back on Lipitor 20 mg daily.  He has a prescription a
t home and has requested that I do not send an additional one to the pharmacy.  He will restart this.
 



I discussed all this with both him, as well as his wife.  He is discharged in stable condition to [facility].



FOLLOWUP:  Followup with his primary care physician at [facility].  He is not sure exactly who this is. 
 He does have an appointment however.





Job #:  [id number]

## 2018-07-22 NOTE — ECHO
[email or web address]





[address] [address] 

Main: [phone] 



Fax: 



Transthoracic Echocardiogram 

Name:             NORBERTO CORBIN                        MR#:

[id number]

Study Date:       2018                             Study Time:

08:44 AM

YOB: 1970                             Age:

48 year(s)

Height:           177.8 cm (70 in.)                      Weight:

108.86 kg (240 lb.)

BSA:              2.26 m2                                Gender:

Male

Examination:      Echo                                   Indication:

Cardiac: dyspnea, cardiomegaly, hx of mitral



regurge 

Image Quality:    Adequate                               Contrast: 

Requested by:     Ana Garg                           BP:

118 mmHg/86 mmHg

Heart Rate:                                              Rhythm: 

Indication:       Cardiac: dyspnea, cardiomegaly, hx of mitral regurge





Procedure Staff 

Ultrasound Technician:   Sophie Sosa RDCS 

Reading Physician:       Jayne Nance MD 

Requesting Provider: 



Conclusions:           Normal size left ventricle.  

No LV hypertrophy.  

Normal global systolic LV function.  

EF is 58 %.  

No regional wall motion abnormality.  

Normal diastolic LV function.  

Normal size right ventricle.  

Normal RV function.  

Mild mitral valve regurgitation is present.  

Mild tricuspid regurgitation is present.  

The pulmonary artery pressure is normal.  

Compared with 2014 MR is now mild instead of trace 



Measurements: 

Chambers                    Valvular Assessment AV/MV

Valvular Assessment TV/PV



Normal                                   Normal

Normal

Name         Value     Range              Name         Value Range

Name           Value Range

Ao Aretha (2D): 2.8 cm    (1.4 cm-2.6            AV Vmax:     1.13 m/s (1

m/s-1.7       PV Vmax:       0.79 m/s (0.6 m/s-0.9



cm)                                  m/s)

m/s)

IVSd (2D):   1.0 cm (0.6 cm-1.1               AV maxP mmHg ( -

)          PV PGmax:      2  mmHg ( - )



cm)                AV meanP mmHg ( - )  

LVDd (2D):   4.8 cm    (4.2 cm-5.9            CAROL (VTI):   2.6 cm ( -

)



cm)                MV E Vmax:   0.90 m/s ( - )  

LVDs (2D):   3.4 cm    (2.1 cm-4              MV A Vmax:   0.78 m/s (

- )



cm)                MV E/A:      1.15 ( - )  

LVPWd (2D):  1.0 cm    (0.6 cm-1 



cm)                    MV PHT:      0.063 s ( - )  

LVOTd        2.1 cm    2.1 cm mm              MVA (PHT):   3.5 s ( - )





LVEF (MOD4): 58 %      (>=55 %)   



Patient: NORBERTO CORBIN                      MRN: [id number]

Study Date: 2018   Page 1 of 2

08:44 AM 









RVDd(2D): 2.8 cm (1.9 cm-3.8 

cmmm)  



Continued Measurements: 

Chambers                      Valvular Assessment AV/MV

Valvular Assessment TV/PV



Name                       Value          Name

Value    Name                      Value

LADs:                    4.0 cm               MV DecTime:

204 m/s     CVP (est.):             5 mmHg

LADs Lon.9 cm               MV E' Septal:

0.09  m/s

LA Area:                 21.6 cm2         MV E/E' Septal:        10.40



RA Area:                 13.8 cm2         MV E/E' Lateral:       8.50





Additional Vessels  



Name                       Value  

Ao Ascending:            3.0 cm    

Inferior Vena Cava:      1.8 cm    



Findings:              Left Ventricle: 

Normal size left ventricle. No LV hypertrophy. Normal global systolic

LV function. EF is 58 %. No

regional wall motion abnormality. Normal diastolic LV function.  

Right Ventricle: 

Normal size right ventricle. Normal RV function.  

Left Atrium: 

The left atrium is normal in size.  

Right Atrium: 

The right atrium is normal in size.  

Mitral Valve: 

The mitral valve is normal in appearance and function. Mild mitral

valve regurgitation is present. No

mitral stenosis is present.  

Aortic Valve: 

The aortic valve is tri-leaflet. There is no significant aortic valve

regurgitation. No aortic valve

stenosis is present.  

Tricuspid Valve: 

The tricuspid valve is normal in appearance and function. Mild

tricuspid regurgitation is present. The

pulmonary artery pressure is normal.  

Pulmonic Valve: 

The pulmonic valve is normal in appearance and function. There is no

pulmonic regurgitation seen.

Aorta: 

The aorta is normal. Normal size aortic root measuring 2.8 cm. Normal

size ascending aorta

measuring 3.0 cm.  

IVC: 

The IVC is normal sized.  

Pericardium: 

No pericardial effusion. No pleural effusion. 







Electronically signed by Jayne Nance MD on 2018 at 10:12 AM 

(No Signature Object) 



Patient: NORBERTO CORBIN                      MRN: [id number]

Study Date: 2018   Page 2 of 2

08:44 AM 







[id number]

## 2018-07-22 NOTE — ASMTLACE
LACE

 

Length of stay for            Answers:  Less than 1 day                       

current admission                                                             

Acuity / Level of             Answers:  No                                    

Care: Did the patient                                                         

have an inpatient                                                             

admission?                                                                    

Comorbidities - select        Answers:  Other                         Notes:  GERD, hyperlipidemia, b
ord

all that apply                                                                lynsey HTN, daily tobac
co 

                                                                              use,

# of Emergency department     Answers:  1-2                                   

visits in the last 6                                                          

months                                                                        

Social determinants           Answers:  Mental health diagnosis               

                                        (anxiety, depression, pers            

                                        onality disorders, etc.)              

Score: 5

 

Date Signed:  07/22/2018 01:58 PM

Electronically Signed By:Jannette Alvarado RN

## 2018-09-17 ENCOUNTER — HOSPITAL ENCOUNTER (EMERGENCY)
Dept: HOSPITAL 80 - FED | Age: 48
Discharge: HOME | End: 2018-09-17
Payer: MEDICAID

## 2018-09-17 VITALS — DIASTOLIC BLOOD PRESSURE: 87 MMHG | SYSTOLIC BLOOD PRESSURE: 131 MMHG

## 2018-09-17 DIAGNOSIS — K64.8: Primary | ICD-10-CM

## 2018-09-17 DIAGNOSIS — F41.8: ICD-10-CM

## 2018-09-17 DIAGNOSIS — E27.9: ICD-10-CM

## 2018-09-17 DIAGNOSIS — F17.210: ICD-10-CM

## 2018-09-17 LAB
INR PPP: 1.08 (ref 0.83–1.16)
PLATELET # BLD: 241 10^3/UL (ref 150–400)
PROTHROMBIN TIME: 14.2 SEC (ref 12–15)

## 2018-09-17 PROCEDURE — 0DJD8ZZ INSPECTION OF LOWER INTESTINAL TRACT, VIA NATURAL OR ARTIFICIAL OPENING ENDOSCOPIC: ICD-10-PCS | Performed by: EMERGENCY MEDICINE

## 2018-09-17 NOTE — EDPHY
H & P


Stated Complaint: NOTED BLOOD WITH STOOLING TODAY/HAS HX OF INTERNAL 

HEMORRHOIDS WITH RECTAL


Time Seen by Provider: 09/17/18 13:52


HPI/ROS: 





CHIEF COMPLAINT:  Abdominal pain, bright red blood per rectum





HISTORY OF PRESENT ILLNESS:  48-year-old male complaining of left lower 

quadrant abdominal pain and painless bright red blood per rectum this morning, 

described as clots and diffuse red coloration to the toilet water when he had a 

bowel movement.  Currently complaining of some mild abdominal pain.  No syncope 

or near syncope.  No lightheadedness.  No back or flank pain.  No gingival 

bleeding.  No unexplained bruising please areas no hematuria





PRIMARY CARE PROVIDER:Dr. Reena Dalal  





REVIEW OF SYSTEMS:


10 systems reviewed and negative with the exception of the elements mentioned 

in the history of present illness








PAST MEDICAL & SURGICAL  HISTORY:  Internal hemorrhoids history





SOCIAL HISTORY:  Nonsmoker   








************


PHYSICAL EXAM





(Prior to examination, patient consented to physical exam, hands were washed 

and my usual and customary physical exam procedures followed)


1) GENERAL: Well-developed, well-nourished, alert and oriented.  Appears to be 

in no acute distress.


2) HEAD: Normocephalic, atraumatic


3) HEENT: Pupils equal, round, reactive to light bilaterally.  Sclera 

anicteric.  Nasopharynx, oropharynx, clear, no lesions.   Moist mucous 

membranes.


4) NECK: Full range of motion, no meningeal signs.


5) LUNGS: Clear auscultation bilaterally, no wheezes, no rhonchi, no 

retractions.   


6) HEART: Regular rate and rhythm, no murmur, no heave, no gallop.


7) ABDOMEN: No guarding, tender palpation left upper and left lower quadrant, 

no mass., negative McBurney's, negative Sánchez's, negative peritoneal sign,


8) MUSCULOSKELETAL: Moving all extremities, no focal areas of tenderness, no 

obvious trauma.  No peripheral edema or discoloration.


9) BACK: No CVA tenderness, no midline vertebral tenderness, no fluctuance, no 

step-off, no obvious trauma, no visual or palpable abnormality. 


10) SKIN: No rash, no petechiae. 


11) rectal:  Normal perianal examination, no external hemorrhoids. Brown stool 

on glove, anoscopy.  Positive internal hemorrhoids noted on anoscopy








***************





DIFFERENTIAL DIAGNOSIS:   In no particular include but limited to bleeding 

hemorrhoid, GI bleed, malignancy








- Personal History


Current Tetanus Diphtheria and Acellular Pertussis (TDAP): No





- Medical/Surgical History


Hx Asthma: No


Hx Chronic Respiratory Disease: No


Hx Diabetes: No


Hx Cardiac Disease: No


Hx Renal Disease: No


Hx Cirrhosis: No


Hx Alcoholism: No


Hx HIV/AIDS: No


Hx Splenectomy or Spleen Trauma: No


Other PMH: pmh- depression, anxiety, GERD INTERNAL HEMORRHOIDS.  right bicep 

surgery





- Social History


Smoking Status: Current every day smoker


Constitutional: 


 Initial Vital Signs











Temperature (C)  36.8 C   09/17/18 12:39


 


Heart Rate  68   09/17/18 12:39


 


Respiratory Rate  17   09/17/18 12:39


 


Blood Pressure  127/80 H  09/17/18 12:39


 


O2 Sat (%)  96   09/17/18 12:39








 











O2 Delivery Mode               Room Air














Allergies/Adverse Reactions: 


 





No Known Allergies Allergy (Verified 09/17/18 12:37)


 








Home Medications: 














 Medication  Instructions  Recorded


 


ARIPiprazole [Abilify 10 mg (*)] 10 mg PO DAILY 07/22/18


 


Albuterol [Proventil Inhaler HFA 1 - 2 puffs IH Q4H #1 mdi 07/22/18





(*)]  


 


Atorvastatin Calcium [Lipitor 20 20 mg PO DAILY #30 tab 07/22/18





mg (*)]  


 


Escitalopram Oxalate [Lexapro 10 10 mg PO DAILY 07/22/18





MG]  


 


Gabapentin [Neurontin 300 MG (*)] 600 mg PO TID 07/22/18


 


Herbals/Supplements -Info Only 1 ea PO DAILY 07/22/18


 


Ibuprofen [Motrin (*)] 400 mg PO Q6H PRN 07/22/18


 


Prazosin HCl [Minipress 1mg (*)] 2 mg PO HS 07/22/18


 


hydrOXYzine HCL [Hydroxyzine HCl] 50 mg PO TID 07/22/18


 


traZODone [traZODONE 100MG (*)] 100 mg PO HS 07/22/18


 


Zantac  09/17/18














Medical Decision Making





- Diagnostics


Imaging Results: 


 Imaging Impressions





Abdomen CT  09/17/18 14:53


Impression:


1.  No acute findings.


2.  1.5 cm left adrenal nodule almost certainly representing an adenoma but 

incompletely characterized on this study.  Recommend adrenal protocol CT or MRI 

for further evaluation.


3.  Mildly enlarged fatty liver.


4.  Mild splenomegaly.


5.  Additional findings, as above.


 


Findings discussed with Khanh Hussein PA-C, on September 17, 2018 at 1553.








Images reviewed myself


Procedures: 





Procedure:  Anoscopy


Indication:  Hematochezia


Indications risks benefits discussed with patient and he consents.  Anoscope 

introduced in usual and customary fashion.  Internal friable appearing more 

than likely hemorrhoid lesion noted.  Patient tolerated procedure well


ED Course/Re-evaluation: 





Patient was re-evaluated with serial exams.  CT abdomen pelvis was ordered on 

this patient due to complaints both subjectively and objectively of left lower 

quadrant pain, evaluate possible diverticulitis.  This study subsequently 

negative.  an incidental adrenal lesion is noted.  Patient will need follow-up 

for this in this is been noted on his discharge instructions.  He is noted to 

have internal hemorrhoids on anoscopy.  This time I do not think that 

hospitalization is indicated.  Recommend follow up with Gastroenterology or 

General surgery.  Patient is comfortable this plan.  My Usual and customary 

precautions instructions provided.  I saw this patient independently based on 

established practice protocols.  Care of patient under supervision of  

secondary supervising physician Dr Hoover with whom I discussed case.





- Data Points


Laboratory Results: 


 Laboratory Results





 09/17/18 14:23 





 09/17/18 14:23 





 











  09/17/18 09/17/18 09/17/18





  14:30 14:23 14:23


 


WBC      





    


 


RBC      





    


 


Hgb      





    


 


POC Hgb  15.0 gm/dL gm/dL    





   (13.7-17.5)   


 


Hct      





    


 


POC Hct  44 % %    





   (40-51)   


 


MCV      





    


 


MCH      





    


 


MCHC      





    


 


RDW      





    


 


Plt Count      





    


 


MPV      





    


 


Neut % (Auto)      





    


 


Lymph % (Auto)      





    


 


Mono % (Auto)      





    


 


Eos % (Auto)      





    


 


Baso % (Auto)      





    


 


Nucleat RBC Rel Count      





    


 


Absolute Neuts (auto)      





    


 


Absolute Lymphs (auto)      





    


 


Absolute Monos (auto)      





    


 


Absolute Eos (auto)      





    


 


Absolute Basos (auto)      





    


 


Absolute Nucleated RBC      





    


 


Immature Gran %      





    


 


Immature Gran #      





    


 


PT      14.2 SEC SEC





     (12.0-15.0) 


 


INR      1.08 





     (0.83-1.16) 


 


APTT      28.9 SEC SEC





     (23.0-38.0) 


 


POC Sodium  144 mEq/L mEq/L    





   (135-145)   


 


Sodium    143 mEq/L mEq/L  





    (135-145)  


 


POC Potassium  3.7 mEq/L mEq/L    





   (3.3-5.0)   


 


Potassium    4.0 mEq/L mEq/L  





    (3.3-5.0)  


 


POC Chloride  103 mEq/L mEq/L    





   ()   


 


Chloride    105 mEq/L mEq/L  





    ()  


 


Carbon Dioxide    29 mEq/l mEq/l  





    (22-31)  


 


Anion Gap    9 mEq/L mEq/L  





    (8-16)  


 


POC BUN  17 mg/dL mg/dL    





   (7-23)   


 


BUN    17 mg/dL mg/dL  





    (7-23)  


 


Creatinine    1.1 mg/dL mg/dL  





    (0.7-1.3)  


 


POC Creatinine  1.2 mg/dL mg/dL    





   (0.7-1.3)   


 


Estimated GFR    > 60   





    


 


Glucose    84 mg/dL mg/dL  





    ()  


 


POC Glucose  83 mg/dL mg/dL    





   ()   


 


Calcium    9.2 mg/dL mg/dL  





    (8.5-10.4)  


 


Total Bilirubin    0.4 mg/dL mg/dL  





    (0.1-1.4)  


 


Conjugated Bilirubin    0.1 mg/dL mg/dL  





    (0.0-0.5)  


 


Unconjugated Bilirubin    0.3 mg/dL mg/dL  





    (0.0-1.1)  


 


AST    24 IU/L IU/L  





    (17-59)  


 


ALT    57 IU/L IU/L  





    (21-72)  


 


Alkaline Phosphatase    66 IU/L IU/L  





    ()  


 


Total Protein    6.9 g/dL g/dL  





    (6.3-8.2)  


 


Albumin    4.2 g/dL g/dL  





    (3.5-5.0)  


 


Lipase    73 IU/L IU/L  





    ()  


 


Stool Occult Bld Scrn      





    














  09/17/18 09/17/18





  14:23 14:08


 


WBC  6.25 10^3/uL 10^3/uL  





   (3.80-9.50)  


 


RBC  4.77 10^6/uL 10^6/uL  





   (4.40-6.38)  


 


Hgb  14.7 g/dL g/dL  





   (13.7-17.5)  


 


POC Hgb    





   


 


Hct  42.8 % %  





   (40.0-51.0)  


 


POC Hct    





   


 


MCV  89.7 fL fL  





   (81.5-99.8)  


 


MCH  30.8 pg pg  





   (27.9-34.1)  


 


MCHC  34.3 g/dL g/dL  





   (32.4-36.7)  


 


RDW  13.3 % %  





   (11.5-15.2)  


 


Plt Count  241 10^3/uL 10^3/uL  





   (150-400)  


 


MPV  9.6 fL fL  





   (8.7-11.7)  


 


Neut % (Auto)  54.9 % %  





   (39.3-74.2)  


 


Lymph % (Auto)  34.7 % %  





   (15.0-45.0)  


 


Mono % (Auto)  8.3 % %  





   (4.5-13.0)  


 


Eos % (Auto)  1.0 % %  





   (0.6-7.6)  


 


Baso % (Auto)  0.3 % %  





   (0.3-1.7)  


 


Nucleat RBC Rel Count  0.0 % %  





   (0.0-0.2)  


 


Absolute Neuts (auto)  3.43 10^3/uL 10^3/uL  





   (1.70-6.50)  


 


Absolute Lymphs (auto)  2.17 10^3/uL 10^3/uL  





   (1.00-3.00)  


 


Absolute Monos (auto)  0.52 10^3/uL 10^3/uL  





   (0.30-0.80)  


 


Absolute Eos (auto)  0.06 10^3/uL 10^3/uL  





   (0.03-0.40)  


 


Absolute Basos (auto)  0.02 10^3/uL 10^3/uL  





   (0.02-0.10)  


 


Absolute Nucleated RBC  0.00 10^3/uL 10^3/uL  





   (0-0.01)  


 


Immature Gran %  0.8 % %  





   (0.0-1.1)  


 


Immature Gran #  0.05 10^3/uL 10^3/uL  





   (0.00-0.10)  


 


PT    





   


 


INR    





   


 


APTT    





   


 


POC Sodium    





   


 


Sodium    





   


 


POC Potassium    





   


 


Potassium    





   


 


POC Chloride    





   


 


Chloride    





   


 


Carbon Dioxide    





   


 


Anion Gap    





   


 


POC BUN    





   


 


BUN    





   


 


Creatinine    





   


 


POC Creatinine    





   


 


Estimated GFR    





   


 


Glucose    





   


 


POC Glucose    





   


 


Calcium    





   


 


Total Bilirubin    





   


 


Conjugated Bilirubin    





   


 


Unconjugated Bilirubin    





   


 


AST    





   


 


ALT    





   


 


Alkaline Phosphatase    





   


 


Total Protein    





   


 


Albumin    





   


 


Lipase    





   


 


Stool Occult Bld Scrn    POSITIVE  H 





    (NEGATIVE) 











Point of Care Test Results: 


 Chemistry











  09/17/18





  14:30


 


POC Sodium  144 mEq/L mEq/L





   (135-145) 


 


POC Potassium  3.7 mEq/L mEq/L





   (3.3-5.0) 


 


POC Chloride  103 mEq/L mEq/L





   () 


 


POC BUN  17 mg/dL mg/dL





   (7-23) 


 


POC Creatinine  1.2 mg/dL mg/dL





   (0.7-1.3) 


 


POC Glucose  83 mg/dL mg/dL





   () 








 ISTAT H&H











  09/17/18





  14:30


 


POC Hgb  15.0 gm/dL gm/dL





   (13.7-17.5) 


 


POC Hct  44 % %





   (40-51) 














Departure





- Departure


Disposition: Home, Routine, Self-Care


Clinical Impression: 


 Internal hemorrhoids





Condition: Good


Instructions:  Hemorrhoids (ED), Rectal Bleeding (ED)


Additional Instructions: 


Return to the ER if you develop new or worsening symptoms, if you develop 

lightheadedness, abdominal pain or any other symptoms that concern you.





You were noted to have an incidental 1.5 cm left adrenal nodule.  This needs 

further evaluation.  Recommend you established primary care.


Referrals: 


Frankel,Zara, MD [Medical Doctor] - 5-7 days, call for appt. (Dr Frankel is a 

primary care provider)


Kai Rocha MD [Medical Doctor] - 2-3 days, call for appt. (Dr Rocha is 

a gastroenterologist)


Robert Chopra MD [Medical Doctor] - 2-3 days, call for appt. (Dr Chopra 

is a surgeon)